# Patient Record
Sex: FEMALE | Race: WHITE | NOT HISPANIC OR LATINO | URBAN - METROPOLITAN AREA
[De-identification: names, ages, dates, MRNs, and addresses within clinical notes are randomized per-mention and may not be internally consistent; named-entity substitution may affect disease eponyms.]

---

## 2017-10-23 ENCOUNTER — IMPORTED ENCOUNTER (OUTPATIENT)
Dept: URBAN - METROPOLITAN AREA CLINIC 38 | Facility: CLINIC | Age: 57
End: 2017-10-23

## 2017-10-23 PROBLEM — H40.013 OPEN ANGLE WITH BORDERLINE FINDINGS, LOW RISK, BILATERAL: Noted: 2017-10-23

## 2017-10-23 PROBLEM — H04.123 TEAR FILM INSUFFICIENCY OF BILATERAL LACRIMAL GLANDS: Noted: 2017-10-23

## 2017-10-23 PROBLEM — E11.9 TYPE II DIABETES WITHOUT COMPLICATIONS: Noted: 2017-10-23

## 2017-10-23 PROBLEM — H25.13 CATARACT (AGE RELATED) - NS (NUCLEAR) - OU: Noted: 2017-10-23

## 2017-10-23 PROCEDURE — 92014 COMPRE OPH EXAM EST PT 1/>: CPT

## 2018-12-03 ENCOUNTER — IMPORTED ENCOUNTER (OUTPATIENT)
Dept: URBAN - METROPOLITAN AREA CLINIC 38 | Facility: CLINIC | Age: 58
End: 2018-12-03

## 2018-12-03 PROBLEM — H40.013 OPEN ANGLE WITH BORDERLINE FINDINGS, LOW RISK, BILATERAL: Noted: 2018-12-03

## 2018-12-03 PROBLEM — H04.123 TEAR FILM INSUFFICIENCY OF BILATERAL LACRIMAL GLANDS: Noted: 2018-12-03

## 2018-12-03 PROBLEM — Z79.899 OTHER LONG TERM DRUG THERAPY: Noted: 2018-12-03

## 2018-12-03 PROBLEM — H25.13 CATARACT (AGE RELATED) - NS (NUCLEAR) - OU: Noted: 2018-12-03

## 2018-12-03 PROBLEM — E11.9 TYPE II DIABETES WITHOUT COMPLICATIONS: Noted: 2018-12-03

## 2018-12-03 PROCEDURE — 92015 DETERMINE REFRACTIVE STATE: CPT

## 2018-12-03 PROCEDURE — 92014 COMPRE OPH EXAM EST PT 1/>: CPT

## 2018-12-03 PROCEDURE — 92083 EXTENDED VISUAL FIELD XM: CPT

## 2020-11-06 ENCOUNTER — IMPORTED ENCOUNTER (OUTPATIENT)
Dept: URBAN - METROPOLITAN AREA CLINIC 38 | Facility: CLINIC | Age: 60
End: 2020-11-06

## 2020-11-06 PROBLEM — Z79.899 OTHER LONG TERM DRUG THERAPY: Noted: 2020-11-06

## 2020-11-06 PROBLEM — H40.013 OPEN ANGLE WITH BORDERLINE FINDINGS, LOW RISK, BILATERAL: Noted: 2020-11-06

## 2020-11-06 PROBLEM — H04.123 TEAR FILM INSUFFICIENCY OF BILATERAL LACRIMAL GLANDS: Noted: 2020-11-06

## 2020-11-06 PROBLEM — E11.9 TYPE II DIABETES WITHOUT COMPLICATIONS: Noted: 2020-11-06

## 2020-11-06 PROBLEM — H43.813 VITREOUS DEGENERATION, BILATERAL: Noted: 2020-11-06

## 2020-11-06 PROCEDURE — 92015 DETERMINE REFRACTIVE STATE: CPT

## 2020-11-06 PROCEDURE — 92014 COMPRE OPH EXAM EST PT 1/>: CPT

## 2021-01-18 ENCOUNTER — IMPORTED ENCOUNTER (OUTPATIENT)
Dept: URBAN - METROPOLITAN AREA CLINIC 38 | Facility: CLINIC | Age: 61
End: 2021-01-18

## 2021-01-18 PROBLEM — H04.123 TEAR FILM INSUFFICIENCY OF BILATERAL LACRIMAL GLANDS: Noted: 2021-01-18

## 2021-01-18 PROCEDURE — 92015 DETERMINE REFRACTIVE STATE: CPT

## 2021-01-18 PROCEDURE — 92012 INTRM OPH EXAM EST PATIENT: CPT

## 2022-06-18 ASSESSMENT — VISUAL ACUITY
OD_SC: J2
OS_SC: J2
OD_CC: 20/50+1
OS_CC: J4
OS_CC: 20/25
OD_SC: J3
OS_BAT: 20/30+
OS_CC: 20/25
OD_CC: 20/25
OS_SC: J5
OS_CC: 20/25-1
OS_BAT: 20/30-
OD_BAT: 20/40
OD_CC: J1
OS_CC: 20/20-1
OD_CC: 20/40
OD_CC: 20/30+1
OD_BAT: 20/50+
OS_BAT: 20/25-
OD_BAT: 20/50-

## 2022-06-18 ASSESSMENT — KERATOMETRY
OS_K2POWER_DIOPTERS: 48.50
OD_K2POWER_DIOPTERS: 48.75
OS_AXISANGLE_DEGREES: 180
OD_AXISANGLE2_DEGREES: 75
OS_AXISANGLE2_DEGREES: 90
OD_AXISANGLE_DEGREES: 161
OS_AXISANGLE2_DEGREES: 125
OD_AXISANGLE_DEGREES: 165
OS_K1POWER_DIOPTERS: 48.25
OS_K2POWER_DIOPTERS: 48.50
OD_K1POWER_DIOPTERS: 49.00
OD_K1POWER_DIOPTERS: 49.00
OS_AXISANGLE_DEGREES: 35
OS_K1POWER_DIOPTERS: 48.25
OD_AXISANGLE2_DEGREES: 71
OD_K2POWER_DIOPTERS: 48.50

## 2022-06-18 ASSESSMENT — PACHYMETRY
OD_CT_UM: C:  FINAL: 626.000; P:  IN: 0.000
OD_CT_UM: C:  FINAL: 626.000; P:  IN: 0.000
OS_CT_UM: C:  FINAL: 620.000; P:
OS_CT_UM: C:  FINAL: 620.000; P:

## 2022-06-18 ASSESSMENT — TONOMETRY
OD_IOP_MMHG: 21
OS_IOP_MMHG: 14
OD_IOP_MMHG: 18
OD_IOP_MMHG: 22
OS_IOP_MMHG: 22
OD_IOP_MMHG: 18
OS_IOP_MMHG: 17
OS_IOP_MMHG: 18
OD_IOP_MMHG: 17
OS_IOP_MMHG: 18

## 2025-04-18 ENCOUNTER — HOSPITAL ENCOUNTER (OUTPATIENT)
Facility: HOSPITAL | Age: 65
Setting detail: HOSPITAL OUTPATIENT SURGERY
End: 2025-04-18
Attending: PLASTIC SURGERY | Admitting: PLASTIC SURGERY
Payer: MEDICARE

## 2025-05-13 ENCOUNTER — PRE-ADMISSION TESTING (OUTPATIENT)
Dept: PREADMISSION TESTING | Age: 65
End: 2025-05-13
Payer: MEDICARE

## 2025-07-18 ENCOUNTER — ANESTHESIA (OUTPATIENT)
Dept: OPERATING ROOM | Facility: HOSPITAL | Age: 65
Setting detail: HOSPITAL OUTPATIENT SURGERY
End: 2025-07-18
Payer: MEDICARE

## 2025-07-18 ENCOUNTER — HOSPITAL ENCOUNTER (OUTPATIENT)
Facility: HOSPITAL | Age: 65
Setting detail: HOSPITAL OUTPATIENT SURGERY
Discharge: HOME | End: 2025-07-18
Attending: PLASTIC SURGERY | Admitting: PLASTIC SURGERY
Payer: MEDICARE

## 2025-07-18 VITALS
TEMPERATURE: 97.5 F | DIASTOLIC BLOOD PRESSURE: 70 MMHG | HEART RATE: 77 BPM | OXYGEN SATURATION: 96 % | RESPIRATION RATE: 16 BRPM | SYSTOLIC BLOOD PRESSURE: 120 MMHG

## 2025-07-18 DIAGNOSIS — N62 HYPERTROPHY OF BREAST: ICD-10-CM

## 2025-07-18 DIAGNOSIS — M54.2 NECK PAIN: ICD-10-CM

## 2025-07-18 DIAGNOSIS — M25.519 SHOULDER PAIN, UNSPECIFIED CHRONICITY, UNSPECIFIED LATERALITY: ICD-10-CM

## 2025-07-18 LAB
ERYTHROCYTE [DISTWIDTH] IN BLOOD BY AUTOMATED COUNT: 12.9 % (ref 11.7–14.4)
GLUCOSE BLD-MCNC: 132 MG/DL (ref 70–99)
GLUCOSE BLD-MCNC: 168 MG/DL (ref 70–99)
HCT VFR BLD AUTO: 37 % (ref 35–45)
HGB BLD-MCNC: 12.1 G/DL (ref 11.8–15.7)
MCH RBC QN AUTO: 31 PG (ref 28–33.2)
MCHC RBC AUTO-ENTMCNC: 32.7 G/DL (ref 32.2–35.5)
MCV RBC AUTO: 94.9 FL (ref 83–98)
PLATELET # BLD AUTO: 187 K/UL (ref 150–369)
PMV BLD AUTO: 12.1 FL (ref 9.4–12.3)
POCT TEST: ABNORMAL
POCT TEST: ABNORMAL
RBC # BLD AUTO: 3.9 M/UL (ref 3.93–5.22)
WBC # BLD AUTO: 7.7 K/UL (ref 3.8–10.5)

## 2025-07-18 PROCEDURE — 36000013 HC OR LEVEL 3 EA ADDL MIN: Performed by: PLASTIC SURGERY

## 2025-07-18 PROCEDURE — 36000003 HC OR LEVEL 3 INITIAL 30MIN: Performed by: PLASTIC SURGERY

## 2025-07-18 PROCEDURE — 37000001 HC ANESTHESIA GENERAL: Performed by: PLASTIC SURGERY

## 2025-07-18 PROCEDURE — 27200000 HC STERILE SUPPLY: Performed by: PLASTIC SURGERY

## 2025-07-18 PROCEDURE — 36415 COLL VENOUS BLD VENIPUNCTURE: CPT | Performed by: PLASTIC SURGERY

## 2025-07-18 PROCEDURE — 71000012 HC PACU PHASE 2 EA ADDL MIN: Performed by: PLASTIC SURGERY

## 2025-07-18 PROCEDURE — 85027 COMPLETE CBC AUTOMATED: CPT | Performed by: PLASTIC SURGERY

## 2025-07-18 PROCEDURE — 25000000 HC PHARMACY GENERAL: Performed by: NURSE ANESTHETIST, CERTIFIED REGISTERED

## 2025-07-18 PROCEDURE — 63600000 HC DRUGS/DETAIL CODE: Mod: JZ | Performed by: PLASTIC SURGERY

## 2025-07-18 PROCEDURE — C1713 ANCHOR/SCREW BN/BN,TIS/BN: HCPCS | Performed by: PLASTIC SURGERY

## 2025-07-18 PROCEDURE — 71000011 HC PACU PHASE 1 EA ADDL MIN: Performed by: PLASTIC SURGERY

## 2025-07-18 PROCEDURE — 88305 TISSUE EXAM BY PATHOLOGIST: CPT | Performed by: PLASTIC SURGERY

## 2025-07-18 PROCEDURE — 0H0V0ZZ ALTERATION OF BILATERAL BREAST, OPEN APPROACH: ICD-10-PCS | Performed by: PLASTIC SURGERY

## 2025-07-18 PROCEDURE — 63600000 HC DRUGS/DETAIL CODE: Mod: JZ | Performed by: ANESTHESIOLOGY

## 2025-07-18 PROCEDURE — 63600000 HC DRUGS/DETAIL CODE: Mod: JZ | Performed by: NURSE ANESTHETIST, CERTIFIED REGISTERED

## 2025-07-18 PROCEDURE — 71000001 HC PACU PHASE 1 INITIAL 30MIN: Performed by: PLASTIC SURGERY

## 2025-07-18 PROCEDURE — 25000000 HC PHARMACY GENERAL: Performed by: PLASTIC SURGERY

## 2025-07-18 PROCEDURE — 71000002 HC PACU PHASE 2 INITIAL 30MIN: Performed by: PLASTIC SURGERY

## 2025-07-18 PROCEDURE — 25800000 HC PHARMACY IV SOLUTIONS: Performed by: NURSE ANESTHETIST, CERTIFIED REGISTERED

## 2025-07-18 PROCEDURE — 63700000 HC SELF-ADMINISTRABLE DRUG: Performed by: PLASTIC SURGERY

## 2025-07-18 PROCEDURE — 25800000 HC PHARMACY IV SOLUTIONS: Performed by: PLASTIC SURGERY

## 2025-07-18 RX ORDER — CLINDAMYCIN PHOSPHATE 600 MG/50ML
INJECTION, SOLUTION INTRAVENOUS
Status: DISCONTINUED
Start: 2025-07-18 | End: 2025-07-18 | Stop reason: HOSPADM

## 2025-07-18 RX ORDER — LIDOCAINE HYDROCHLORIDE 10 MG/ML
INJECTION, SOLUTION INFILTRATION; PERINEURAL AS NEEDED
Status: DISCONTINUED | OUTPATIENT
Start: 2025-07-18 | End: 2025-07-18 | Stop reason: SURG

## 2025-07-18 RX ORDER — SODIUM CHLORIDE 9 MG/ML
INJECTION, SOLUTION INTRAVENOUS CONTINUOUS
Status: DISCONTINUED | OUTPATIENT
Start: 2025-07-18 | End: 2025-07-18

## 2025-07-18 RX ORDER — ONDANSETRON HYDROCHLORIDE 2 MG/ML
INJECTION, SOLUTION INTRAVENOUS AS NEEDED
Status: DISCONTINUED | OUTPATIENT
Start: 2025-07-18 | End: 2025-07-18 | Stop reason: SURG

## 2025-07-18 RX ORDER — ONDANSETRON HYDROCHLORIDE 2 MG/ML
4 INJECTION, SOLUTION INTRAVENOUS
Status: DISCONTINUED | OUTPATIENT
Start: 2025-07-18 | End: 2025-07-18 | Stop reason: HOSPADM

## 2025-07-18 RX ORDER — PROPOFOL 10 MG/ML
INJECTION, EMULSION INTRAVENOUS AS NEEDED
Status: DISCONTINUED | OUTPATIENT
Start: 2025-07-18 | End: 2025-07-18 | Stop reason: SURG

## 2025-07-18 RX ORDER — EPINEPHRINE 1 MG/ML
INJECTION, SOLUTION INTRAMUSCULAR; SUBCUTANEOUS
Status: DISCONTINUED | OUTPATIENT
Start: 2025-07-18 | End: 2025-07-18 | Stop reason: HOSPADM

## 2025-07-18 RX ORDER — EPHEDRINE SULFATE 50 MG/ML
INJECTION, SOLUTION INTRAVENOUS AS NEEDED
Status: DISCONTINUED | OUTPATIENT
Start: 2025-07-18 | End: 2025-07-18 | Stop reason: SURG

## 2025-07-18 RX ORDER — ROCURONIUM BROMIDE 10 MG/ML
INJECTION, SOLUTION INTRAVENOUS AS NEEDED
Status: DISCONTINUED | OUTPATIENT
Start: 2025-07-18 | End: 2025-07-18 | Stop reason: SURG

## 2025-07-18 RX ORDER — TRANEXAMIC ACID 1 G/10ML
INJECTION, SOLUTION INTRAVENOUS
Status: DISCONTINUED | OUTPATIENT
Start: 2025-07-18 | End: 2025-07-18 | Stop reason: HOSPADM

## 2025-07-18 RX ORDER — FENTANYL CITRATE 50 UG/ML
50 INJECTION, SOLUTION INTRAMUSCULAR; INTRAVENOUS EVERY 5 MIN PRN
Status: COMPLETED | OUTPATIENT
Start: 2025-07-18 | End: 2025-07-18

## 2025-07-18 RX ORDER — SODIUM CHLORIDE 9 MG/ML
INJECTION, SOLUTION INTRAVENOUS CONTINUOUS PRN
Status: DISCONTINUED | OUTPATIENT
Start: 2025-07-18 | End: 2025-07-18 | Stop reason: SURG

## 2025-07-18 RX ORDER — MIDAZOLAM HYDROCHLORIDE 2 MG/2ML
INJECTION, SOLUTION INTRAMUSCULAR; INTRAVENOUS AS NEEDED
Status: DISCONTINUED | OUTPATIENT
Start: 2025-07-18 | End: 2025-07-18 | Stop reason: SURG

## 2025-07-18 RX ORDER — SODIUM CHLORIDE 9 MG/ML
INJECTION, SOLUTION INTRAMUSCULAR; INTRAVENOUS; SUBCUTANEOUS
Status: DISCONTINUED | OUTPATIENT
Start: 2025-07-18 | End: 2025-07-18 | Stop reason: HOSPADM

## 2025-07-18 RX ORDER — CLINDAMYCIN PHOSPHATE 600 MG/50ML
INJECTION, SOLUTION INTRAVENOUS AS NEEDED
Status: DISCONTINUED | OUTPATIENT
Start: 2025-07-18 | End: 2025-07-18 | Stop reason: SURG

## 2025-07-18 RX ORDER — SODIUM CHLORIDE, SODIUM GLUCONATE, SODIUM ACETATE, POTASSIUM CHLORIDE AND MAGNESIUM CHLORIDE 30; 37; 368; 526; 502 MG/100ML; MG/100ML; MG/100ML; MG/100ML; MG/100ML
125 INJECTION, SOLUTION INTRAVENOUS CONTINUOUS
Status: DISCONTINUED | OUTPATIENT
Start: 2025-07-18 | End: 2025-07-18 | Stop reason: HOSPADM

## 2025-07-18 RX ORDER — FENTANYL CITRATE 50 UG/ML
INJECTION, SOLUTION INTRAMUSCULAR; INTRAVENOUS AS NEEDED
Status: DISCONTINUED | OUTPATIENT
Start: 2025-07-18 | End: 2025-07-18 | Stop reason: SURG

## 2025-07-18 RX ORDER — DEXAMETHASONE SODIUM PHOSPHATE 4 MG/ML
INJECTION, SOLUTION INTRA-ARTICULAR; INTRALESIONAL; INTRAMUSCULAR; INTRAVENOUS; SOFT TISSUE AS NEEDED
Status: DISCONTINUED | OUTPATIENT
Start: 2025-07-18 | End: 2025-07-18 | Stop reason: SURG

## 2025-07-18 RX ORDER — BUPIVACAINE HYDROCHLORIDE 5 MG/ML
INJECTION, SOLUTION EPIDURAL; INTRACAUDAL; PERINEURAL
Status: DISCONTINUED | OUTPATIENT
Start: 2025-07-18 | End: 2025-07-18 | Stop reason: HOSPADM

## 2025-07-18 RX ORDER — HYDROMORPHONE HYDROCHLORIDE 1 MG/ML
0.5 INJECTION, SOLUTION INTRAMUSCULAR; INTRAVENOUS; SUBCUTANEOUS
Status: DISCONTINUED | OUTPATIENT
Start: 2025-07-18 | End: 2025-07-18 | Stop reason: HOSPADM

## 2025-07-18 RX ORDER — SULFAMETHOXAZOLE AND TRIMETHOPRIM 800; 160 MG/1; MG/1
1 TABLET ORAL 2 TIMES DAILY
Qty: 10 TABLET | Refills: 0 | Status: SHIPPED | OUTPATIENT
Start: 2025-07-18 | End: 2025-07-23

## 2025-07-18 RX ORDER — ACETAMINOPHEN AND CODEINE PHOSPHATE 300; 30 MG/1; MG/1
1 TABLET ORAL EVERY 6 HOURS PRN
Qty: 12 TABLET | Refills: 0 | Status: SHIPPED | OUTPATIENT
Start: 2025-07-18 | End: 2025-07-23

## 2025-07-18 RX ORDER — OXYCODONE AND ACETAMINOPHEN 5; 325 MG/1; MG/1
1 TABLET ORAL EVERY 4 HOURS PRN
Refills: 0 | Status: DISCONTINUED | OUTPATIENT
Start: 2025-07-18 | End: 2025-07-18 | Stop reason: HOSPADM

## 2025-07-18 RX ADMIN — ONDANSETRON 4 MG: 2 INJECTION INTRAMUSCULAR; INTRAVENOUS at 11:27

## 2025-07-18 RX ADMIN — PROPOFOL 25 MCG/KG/MIN: 10 INJECTION, EMULSION INTRAVENOUS at 09:27

## 2025-07-18 RX ADMIN — HYDROMORPHONE HYDROCHLORIDE 0.5 MG: 1 INJECTION, SOLUTION INTRAMUSCULAR; INTRAVENOUS; SUBCUTANEOUS at 12:55

## 2025-07-18 RX ADMIN — ROCURONIUM BROMIDE 50 MG: 10 INJECTION, SOLUTION INTRAVENOUS at 09:22

## 2025-07-18 RX ADMIN — LIDOCAINE HYDROCHLORIDE 5 ML: 10 INJECTION, SOLUTION INFILTRATION; PERINEURAL at 09:22

## 2025-07-18 RX ADMIN — SUGAMMADEX 200 MG: 100 INJECTION, SOLUTION INTRAVENOUS at 11:54

## 2025-07-18 RX ADMIN — FENTANYL CITRATE 50 MCG: 50 INJECTION INTRAMUSCULAR; INTRAVENOUS at 10:18

## 2025-07-18 RX ADMIN — FENTANYL CITRATE 50 MCG: 50 INJECTION, SOLUTION INTRAMUSCULAR; INTRAVENOUS at 12:20

## 2025-07-18 RX ADMIN — SODIUM CHLORIDE: 9 INJECTION, SOLUTION INTRAVENOUS at 08:08

## 2025-07-18 RX ADMIN — MIDAZOLAM HYDROCHLORIDE 2 MG: 1 INJECTION, SOLUTION INTRAMUSCULAR; INTRAVENOUS at 09:16

## 2025-07-18 RX ADMIN — OXYCODONE HYDROCHLORIDE AND ACETAMINOPHEN 1 TABLET: 5; 325 TABLET ORAL at 13:44

## 2025-07-18 RX ADMIN — EPHEDRINE SULFATE 10 MG: 50 INJECTION, SOLUTION INTRAVENOUS at 10:25

## 2025-07-18 RX ADMIN — FENTANYL CITRATE 50 MCG: 50 INJECTION, SOLUTION INTRAMUSCULAR; INTRAVENOUS at 12:28

## 2025-07-18 RX ADMIN — HYDROMORPHONE HYDROCHLORIDE 0.5 MG: 1 INJECTION, SOLUTION INTRAMUSCULAR; INTRAVENOUS; SUBCUTANEOUS at 13:17

## 2025-07-18 RX ADMIN — HYDROMORPHONE HYDROCHLORIDE 0.5 MG: 1 INJECTION, SOLUTION INTRAMUSCULAR; INTRAVENOUS; SUBCUTANEOUS at 12:38

## 2025-07-18 RX ADMIN — PROPOFOL 200 MG: 10 INJECTION, EMULSION INTRAVENOUS at 09:22

## 2025-07-18 RX ADMIN — DEXAMETHASONE SODIUM PHOSPHATE 4 MG: 4 INJECTION, SOLUTION INTRA-ARTICULAR; INTRALESIONAL; INTRAMUSCULAR; INTRAVENOUS; SOFT TISSUE at 09:31

## 2025-07-18 RX ADMIN — CLINDAMYCIN PHOSPHATE 600 MG: 600 INJECTION, SOLUTION INTRAVENOUS at 09:36

## 2025-07-18 RX ADMIN — SODIUM CHLORIDE: 0.9 INJECTION, SOLUTION INTRAVENOUS at 09:17

## 2025-07-18 RX ADMIN — FENTANYL CITRATE 100 MCG: 50 INJECTION INTRAMUSCULAR; INTRAVENOUS at 09:20

## 2025-07-18 RX ADMIN — FENTANYL CITRATE 50 MCG: 50 INJECTION INTRAMUSCULAR; INTRAVENOUS at 10:43

## 2025-07-18 ASSESSMENT — ENCOUNTER SYMPTOMS: DYSRHYTHMIAS: 1

## 2025-07-18 NOTE — ANESTHESIOLOGIST PRE-PROCEDURE ATTESTATION
Pre-Procedure Patient Identification:  I am the Primary Anesthesiologist and have identified the patient on 07/18/25 at 8:20 AM.   I have confirmed the procedure(s) will be performed by the following surgeon/proceduralist Clemente Yu MD.

## 2025-07-18 NOTE — OP NOTE
Bilateral Breast Reduction (B) Procedure Note    Procedure:    Bilateral Breast Reduction  CPT(R) Code:  00020 - BREAST REDUCTION      Pre-op Diagnosis      * Hypertrophy of breast [N62]     * Shoulder pain, unspecified chronicity, unspecified laterality [M25.519]     * Neck pain [M54.2]       Post-op Diagnosis     * Hypertrophy of breast [N62]     * Shoulder pain, unspecified chronicity, unspecified laterality [M25.519]     * Neck pain [M54.2]    Surgeons and Role:     * Clemente Yu MD - Primary    Anesthesia: General    Staff:   Circulator: Lake Adams RN; Nellie Virgen, NATALIE; Taryn Vaughan, NATALIE  Scrub Person: Taryn Vaughan RN; Rain Herman  Registered Nurse First Assistant: Kimberly Eastman RNFA    Procedure Details     Indication: Patient is a 65-year-old female with a history of macromastia causing upper neck and shoulder pain who presents today for a bilateral breast reduction. Preoperatively, I discussed with the patient the risks benefits and alternatives including but not limited to bleeding, infection, scar, pain, hematoma, seroma, asymmetry, decreased nipple sensation, partial or total nipple loss. All questions were answered. Surgical consent was then obtained.    Procedure in detail: The patient was marked with a Wise pattern superior medial pedicle pattern preoperatively in the holding area. The patient was then brought to the operating room and placed on the operating room table in the supine position. The anesthesiology staff applied appropriate cardiac and anesthesia monitoring devices. Bilateral SCDs were placed on the patient's lower extremities. Preoperative antibiotics were given. General endotracheal anesthesia was then performed by the anesthesiology staff without difficulty. The patient's arms were then placed up to the side. They were well padded and secured. The chest was then prepped and draped in the usual sterile fashion. Marcaine was used for local  anesthesia and was  injected into the breast parenchyma except the planned pedicle. A 42 mm cookie cutter was used to jeannie the new areolar diameter. This was scored with a 15 blade. Next a 10 blade scalpel was used to de-epithelialized the planned pedicle making sure to preserve the nipple. Bovie electrocautery was then used to develop the pedicle as well as remove breast parenchyma in a Wise pattern resection. Once the appropriate breast parenchyma and skin were resected the area was irrigated with normal saline solution and meticulous hemostasis was then achieved using Bovie electrocautery. A 2-0 nylon suture was then used to bring together the T point of the Wise pattern. The skin was then tailor tacked with a stapler and the patient was then placed then placed in the upright position. There was noted to be good size and shape of the new breast after the reduction. A new nipple position was then marked with a 42 mm cookie cutter. A 15 blade scalpel was used to incise the overlying skin followed by Bovie electrocautery to remove the underlying breast parenchyma. The nipple was then delivered through this area and inset using a stapler. Next the exact same procedure was performed on the contralateral side. Once the contralateral side breast parenchyma was resected again the patient was tailor tacked in place and placed upright to ensure good size, shape, and symmetry bilaterally. The new nipple position was then marked and in a similar fashion the nipple was delivered and tailor tacked in place. Both nipples were healthy and viable after inset. A layered closure was then performed using a dermal stapler and 3-0 Monocryl deep dermal in a interrupted fashion to reapproximate the skin of the inferior and vertical limbs of the Wills pattern. This was followed by a by arunning 3-0 monocryl suture in a subcuticular fashion to close the skin. The nipple was then inset with 4-0 Monocryl in a deep dermal fashion followed by  a 4-0 running subcuticular to close the skin. At the end of closure of the nipples appeared healthy and viable bilaterally. A sterile dressing consisting of dermal glue was then applied followed by multiple dry gauze dressing. Ail needles instruments and laparotomy pads were accounted for. The sterile drapes were then removed from the patient. The patient was extubated by anesthesiology staff without difficulty. A surgical bra was then placed. The patient was then transferred to the recovery room in awake and stable condition. Total resection on the left breast was 377 g, total resection  n right breast was 630 g.       Estimated Blood Loss: 50 mL    Specimens:                Order Name Source Comment Collection Info Order Time   CBC Blood, Venous  Collected By: Trina Newman RN 7/18/2025  8:02 AM     Release to patient   Immediate        PATHOLOGY TISSUE EXAM Breast, Left Pre-op diagnosis:  Hypertrophy of Breast / Shoulder Pain / Neck Pain Collected By: Clemente Yu MD 7/18/2025 11:29 AM     Release to patient   Immediate              Drains: * No LDAs found *    Implants: * No implants in log *           Complications:  None; patient tolerated the procedure well.           Disposition: PACU - hemodynamically stable.           Condition: stable    Clemente Yu MD  Phone Number: [phone]

## 2025-07-18 NOTE — OR SURGEON
Patient seen and examined  Paper H&P in chart  No changes  Risks, benefits discussed  All questions answered  Written consent obtained  Patient marked in holding area

## 2025-07-18 NOTE — ANESTHESIA PROCEDURE NOTES
Airway  Reason: elective    Start Time: 7/18/2025 9:25 AM  Airway not difficult    General Information and Staff   Patient location during procedure: OR  Anesthesiologist: Milli Holland MD  Resident/CRNA: Aram Alvarado CRNA  Performed: anesthesiologist and resident/CRNA   Performed by: Aram Alvarado CRNA  Authorized by: Milli Holland MD        Patient Condition  Indications for airway management: anesthesia  Patient position: sniffing  MILS maintained throughout  Sedation level: deep     Final Airway Details   Preoxygenated: yes  Final airway type: endotracheal airway  Successful airway: ETT   Successful intubation technique: video laryngoscopy  Adjuncts used in placement: intubating stylet  Endotracheal tube insertion site: oral  Blade: Gregoria  Blade size: #3  ETT size (mm): 7.0  Cormack-Lehane Classification: grade I - full view of glottis  Placement verified by: chest auscultation and capnometry   Measured from: teeth  ETT to teeth (cm): 22  Number of attempts at approach: 1  Number of other approaches attempted: 0  Atraumatic airway insertion

## 2025-07-18 NOTE — ANESTHESIA POSTPROCEDURE EVALUATION
Patient: Leda Lozoya    Procedure Summary       Date: 07/18/25 Room / Location: [facility] OR  / [facility] OR [facility]    Anesthesia Start: 0917 Anesthesia Stop: 1213    Procedure: Bilateral Breast Reduction (Bilateral: Breast) Diagnosis:       Hypertrophy of breast      Shoulder pain, unspecified chronicity, unspecified laterality      Neck pain      (Hypertrophy of Breast / Shoulder Pain / Neck Pain)    Surgeons: Clemente Yu MD Responsible Provider: Milli Holland MD    Anesthesia Type: general ASA Status: 2            Anesthesia Type: general  PACU Vitals  7/18/2025 1205 - 7/18/2025 1305        7/18/2025  1217 7/18/2025  1230 7/18/2025  1245 7/18/2025  1300    BP: 133/71 124/72 110/64 127/68    Temp: 36.2 °C (97.2 °F) -- -- --    Pulse: 92 84 77 80    Resp: 16 18 13 --    SpO2: 95 % 99 % 99 % 100 %              Anesthesia Post Evaluation    Pain management: adequate  Patient location during evaluation: PACU  Patient participation: complete - patient participated  Level of consciousness: awake and alert  Cardiovascular status: acceptable  Airway Patency: adequate  Respiratory status: acceptable  Hydration status: acceptable  Anesthetic complications: no

## 2025-07-18 NOTE — DISCHARGE INSTRUCTIONS
SEE DR. MARTIN'S POST-OP INSRUCTION SHEET FOR AFTERCARE    Call office at [phone] with any questions or concerns  Call office to schedule a follow-up appointment

## 2025-07-18 NOTE — ANESTHESIA PREPROCEDURE EVALUATION
Relevant Problems   No relevant active problems     Active Ambulatory Problems     Diagnosis Date Noted    No Active Ambulatory Problems     Resolved Ambulatory Problems     Diagnosis Date Noted    No Resolved Ambulatory Problems     Past Medical History:   Diagnosis Date    Arthritis     Gout     Hypertension     IBS (irritable bowel syndrome)     Lipid disorder     Lumbar stenosis     Osteoporosis     Seasonal allergies     SVT (supraventricular tachycardia) (CMS/HCC)     Type 2 diabetes mellitus (CMS/HCC)        Anesthesia ROS/MED HX    Anesthesia History - neg  Pulmonary - neg  Neuro/Psych - neg  Cardiovascular   dyslipidemia   hypertension  Dysrhythmias (h/o SVT)  Comments: Limited ROM of c-spine 2/2 OA  Hematological - neg  GI/Hepatic- neg  Musculoskeletal   Osteoarthritis  Renal Disease- neg  Endo/Other   Diabetes       Past Surgical History   Procedure Laterality Date    Appendectomy      Cholecystectomy      Epidural steroid injection - caudal      Fracture surgery Right     collarbone with plate and screws    Hysterectomy      Joint replacement Left     knee and revision     Medications Prior to Admission   Medication Sig Dispense Refill Last Dose/Taking    allopurinoL (ZYLOPRIM) 300 mg tablet Take 300 mg by mouth nightly.   7/17/2025    carisoprodoL (SOMA) 350 mg tablet Take 350 mg by mouth every 8 (eight) hours as needed.   7/17/2025    LINZESS 290 mcg capsule Take 290 mcg by mouth daily before breakfast.   7/17/2025    magnesium 200 mg tablet Take 1 tablet by mouth nightly.   7/17/2025    metFORMIN (GLUCOPHAGE) 500 mg tablet Take 500 mg by mouth 3 (three) times a day.   7/17/2025    rosuvastatin (CRESTOR) 40 mg tablet Take 40 mg by mouth nightly.   7/17/2025    verapamiL (CALAN) 120 mg tablet Take 120 mg by mouth 2 (two) times a day. Patient takes 1 1/2 tablets for evening dose.   7/18/2025 Morning    insulin degludec U-200 CONC (TRESIBA FLEXTOUCH U-200) 200 unit/mL (3 mL) pen Inject 25 Units under the  skin nightly.   7/16/2025    OZEMPIC 2 mg/dose (8 mg/3 mL) subcutaneous injection Inject 2 mg under the skin every (seven) 7 days. FRIDAYS 7/4/2025       Physical Exam    Airway   Mallampati: II   TM distance: >3 FB   Neck ROM: limited  Cardiovascular - normal   Rhythm: regular   Rate: normalPulmonary - normal   clear to auscultation  Other Findings   Limited ROM of c-spine 2/2 OA  Dental - normal        Anesthesia Plan    Plan: general    Technique: general endotracheal     Lines and Monitors: PIV     Airway: direct visual laryngoscopy    2 ASA  Anesthetic plan and risks discussed with: patient  Induction:    intravenous   Comments:    Plan: Plan: Risks and benefits of general anesthesia discussed with patient at beside pre-operatively. Risks include but are not limited to sore throat, pneumonia, risk of injury to lips/teeth/mouth/throat, risk of possible arterial line (risk of infection/bleeding/injury to blood vessel or nerves), risk of injury to any organ in the body including heart/kidneys/liver/lungs/brain/etc. All questions answered, and the patient states understanding of anesthesia plan and wishes to proceed.

## 2025-07-21 LAB
CASE RPRT: NORMAL
CLINICAL INFO: NORMAL
PATH REPORT.FINAL DX SPEC: NORMAL
PATH REPORT.GROSS SPEC: NORMAL

## (undated) DEVICE — SLEEVE SCD COMFORT KNEE LENGTH MED

## (undated) DEVICE — TOWEL SURGICAL W17XL27IN BLUE COTTON STANDARD PREWASHED DELI

## (undated) DEVICE — BANDAGE GAUZE BULKEE II 4.5X4.1YD STRL

## (undated) DEVICE — MANIFOLD SINGLE PORT NEPTUNE

## (undated) DEVICE — STAPLER SKIN

## (undated) DEVICE — GLOVE SZ 7 PROTEXIS PI

## (undated) DEVICE — SUTURE BIOSYN 4-0 UNDYED 1X18 P-12

## (undated) DEVICE — SUTURE STRATAFIX PGA 3-0 FS-1 CUTTING 30CM

## (undated) DEVICE — TUBING SMOKE EVAC PENCIL COATED

## (undated) DEVICE — STAPLER INSORB 25 SUBCUTICULAR SKIN

## (undated) DEVICE — NEEDLE DISP SPINAL 18GX3 1/2

## (undated) DEVICE — SUTURE MONOSOF 2-0 BLACK 1X30 C-17

## (undated) DEVICE — COVER LIGHTHANDLE STERILE BLUE

## (undated) DEVICE — SYRINGE DISP LUER-LOK 30 CC

## (undated) DEVICE — BLADE SCALPEL #10

## (undated) DEVICE — PACK RFID MAJOR BREAST

## (undated) DEVICE — SUTURE BIOSYN 3-0 UNDYED 1X18 P-12

## (undated) DEVICE — BLADE SCALPEL #15

## (undated) DEVICE — ADHESIVE SKIN DERMABOND ADVANCED 0.7ML

## (undated) DEVICE — BLANKET LOWER BODY

## (undated) DEVICE — MARKER SKIN W/RULER  STERILE

## (undated) DEVICE — PAD GROUND ELECTROSURGICAL W/CORD